# Patient Record
Sex: FEMALE | Race: WHITE
[De-identification: names, ages, dates, MRNs, and addresses within clinical notes are randomized per-mention and may not be internally consistent; named-entity substitution may affect disease eponyms.]

---

## 2018-09-18 ENCOUNTER — TRANSCRIPTION ENCOUNTER (OUTPATIENT)
Age: 58
End: 2018-09-18

## 2022-10-25 PROBLEM — Z00.00 ENCOUNTER FOR PREVENTIVE HEALTH EXAMINATION: Status: ACTIVE | Noted: 2022-10-25

## 2022-10-26 ENCOUNTER — APPOINTMENT (OUTPATIENT)
Dept: PULMONOLOGY | Facility: CLINIC | Age: 62
End: 2022-10-26

## 2022-10-26 VITALS
SYSTOLIC BLOOD PRESSURE: 138 MMHG | OXYGEN SATURATION: 100 % | WEIGHT: 138 LBS | DIASTOLIC BLOOD PRESSURE: 80 MMHG | BODY MASS INDEX: 22.18 KG/M2 | TEMPERATURE: 97.2 F | HEART RATE: 69 BPM | HEIGHT: 66 IN

## 2022-10-26 PROCEDURE — 99204 OFFICE O/P NEW MOD 45 MIN: CPT | Mod: 25

## 2022-10-26 PROCEDURE — 99407 BEHAV CHNG SMOKING > 10 MIN: CPT

## 2022-10-26 NOTE — ASSESSMENT
[FreeTextEntry1] : 63 yo woman referred by Dr Esacmilla for evaluation of COPD\par Previously seen by pulmonologist\par Smoker for >40 years and still smokes almost 1 ppd\par Has not been interested in stopping\par Treated for COPD with Incruse only\par No history of hosptialiZations for COPD \par She had been taking care of her sister who recently passed away--also a heavy smoker\par Hx HT on amlodipine, Hx HLD on Lipitor\par No med allergies noted\par Not , two children--her son brought her here\par Says she walks several miles a day without difficulty\par \par Lung Cancer screening 2/22 performed and reviewed\par Lung RADS 2, mod to severe emphysema changes noted\par \par Imp\par 63 yo woman with HT and HLD\par She is a longterm and persistent smoker\par Probable COPD based upon history and CT findings\par \par  We had an extensive discussion regarding the multiple potential complications of chronic smoking including pulmonary disease, shortness of breath, emphysema, requirement for oxygen,  pulmonary neoplasm, cardiovascular disease, and cerebrovascular disease. We discussed potential strategies for decreasing smoking and smoking cessation for at least 12 minutes during the patient's visit.\par  \par Recommend Renew Incruse\par Repeat lung cancer screening in Feb 23\par At next visit, do PFTs\par Encourage smoking cessation

## 2022-10-26 NOTE — HISTORY OF PRESENT ILLNESS
[TextBox_4] : 63 yo woman referred by Dr Escamilla for evaluation of COPD\par Previously seen by pulmonologist\par Smoker for >40 years and still smokes almost 1 ppd\par Has not been interested in stopping\par Treated for COPD with Incruse only\par No history of hosptialiZations for COPD \par She had been taking care of her sister who recently passed away--also a heavy smoker\par Hx HT on amlodipine, Hx HLD on Lipitor\par No med allergies noted\par Not , two children--her son brought her here\par Says she walks several miles a day without difficulty

## 2022-10-26 NOTE — CONSULT LETTER
[Dear  ___] : Dear  [unfilled], [FreeTextEntry1] : I had the pleasure of evaluating your patient, AMY OLIVERA , in the office today.  Please review my consultation and evaluation report that follows below.  Please do not hesitate to call me if further information is necessary or if you wish to discuss ongoing care or diagnostic work-up.   \par I very much appreciate your referral and it is a privilege to be able to provide care for your patient.\par \par Sincerely,\par  \par Jak William MD, MHCM, FACP, DAWSON-C\par Pulmonary Medicine\par  of Medicine\par [facility] [employer] [facility] at [facility]\par [email or web address] \par [facility] [place]\par [address]\[place]  [address]\par    Fax \par \par [facility] [place]\par [address]\par [place], [place] \par \par

## 2023-01-06 ENCOUNTER — NON-APPOINTMENT (OUTPATIENT)
Age: 63
End: 2023-01-06

## 2023-02-15 ENCOUNTER — APPOINTMENT (OUTPATIENT)
Dept: PULMONOLOGY | Facility: CLINIC | Age: 63
End: 2023-02-15
Payer: MEDICAID

## 2023-02-15 VITALS
HEART RATE: 99 BPM | OXYGEN SATURATION: 97 % | WEIGHT: 133 LBS | BODY MASS INDEX: 21.38 KG/M2 | TEMPERATURE: 97.3 F | HEIGHT: 66 IN | DIASTOLIC BLOOD PRESSURE: 79 MMHG | SYSTOLIC BLOOD PRESSURE: 143 MMHG

## 2023-02-15 PROCEDURE — 99214 OFFICE O/P EST MOD 30 MIN: CPT | Mod: 25

## 2023-02-15 PROCEDURE — 99406 BEHAV CHNG SMOKING 3-10 MIN: CPT

## 2023-02-15 NOTE — CONSULT LETTER
[Dear  ___] : Dear  [unfilled], [FreeTextEntry1] : I had the pleasure of evaluating your patient, AMY OLIVERA , in the office today.  Please review my consultation and evaluation report that follows below.  Please do not hesitate to call me if further information is necessary or if you wish to discuss ongoing care or diagnostic work-up.   \par I very much appreciate your referral and it is a privilege to be able to provide care for your patient.\par \par Sincerely,\par  \par Jak William MD, MHCM, FACP, DAWSON-C\par Pulmonary Medicine\par  of Medicine\par [employer] [facility] at [facility]\par [email or web address]\par \par [facility] in [place]\par [address]\[place]  [address]\par    Fax \par \par [facility] [place]\par [address]\par [place], [place] \par \par

## 2023-02-15 NOTE — ASSESSMENT
[FreeTextEntry1] : 61 yo woman referred by Dr Escamilla for evaluation of COPD\par Previously seen by pulmonologist\par Smoker for >40 years and still smokes almost 1 ppd\par Has not been interested in stopping\par Treated for COPD with Incruse only\par No history of hosptialiZations for COPD \par She had been taking care of her sister who recently passed away--also a heavy smoker\par Hx HT on amlodipine, Hx HLD on Lipitor\par No med allergies noted\par Not , two children--her son brought her here\par Says she walks several miles a day without difficulty. \par \par Interim:\par Still smoking a bit more than 1/2 ppd unfortunatley\par Has not attempted to stop\par On Incruse daily--wants refill, doesn’t have rescue inhaler\par Had CT Lung Cancer Screening on 12/28/22\par Small nodules which require f/u in one year\par Lung RADS 2\par Patchy areas of emphysema\par \par Imp\par 61 yo woman with longterm smoking and probable COPD\par Discussed smoking again extensively\par Renew Incruse, add albuterol\par Recommend referral for PFTs \par RTC 3 months\par Lung Screening in Dec 23

## 2023-02-15 NOTE — HISTORY OF PRESENT ILLNESS
[TextBox_4] : 63 yo woman referred by Dr Escamilla for evaluation of COPD\par Previously seen by pulmonologist\par Smoker for >40 years and still smokes almost 1 ppd\par Has not been interested in stopping\par Treated for COPD with Incruse only\par No history of hosptalizations for COPD \par She had been taking care of her sister who recently passed away--also a heavy smoker\par Hx HT on amlodipine, Hx HLD on Lipitor\par No med allergies noted\par Not , two children--her son brought her here\par Says she walks several miles a day without difficulty. \par \par Interim:\par Still smoking a bit more than 1/2 ppd unfortunatley\par Has not attempted to stop\par On Incruse daily--wants refill, doesn’t have rescue inhaler\par Had CT Lung Cancer Screening on 12/28/22\par Small nodules which require f/u in one year\par Lung RADS 2\par Patchy areas of emphysema

## 2023-05-15 ENCOUNTER — APPOINTMENT (OUTPATIENT)
Dept: PULMONOLOGY | Facility: CLINIC | Age: 63
End: 2023-05-15
Payer: MEDICAID

## 2023-05-15 VITALS — HEIGHT: 66 IN | BODY MASS INDEX: 22.34 KG/M2 | WEIGHT: 139 LBS

## 2023-05-15 VITALS
DIASTOLIC BLOOD PRESSURE: 58 MMHG | RESPIRATION RATE: 16 BRPM | SYSTOLIC BLOOD PRESSURE: 130 MMHG | HEART RATE: 60 BPM | OXYGEN SATURATION: 99 %

## 2023-05-15 DIAGNOSIS — J44.9 CHRONIC OBSTRUCTIVE PULMONARY DISEASE, UNSPECIFIED: ICD-10-CM

## 2023-05-15 DIAGNOSIS — F17.200 NICOTINE DEPENDENCE, UNSPECIFIED, UNCOMPLICATED: ICD-10-CM

## 2023-05-15 PROCEDURE — 99214 OFFICE O/P EST MOD 30 MIN: CPT | Mod: 25

## 2023-05-15 PROCEDURE — 99407 BEHAV CHNG SMOKING > 10 MIN: CPT

## 2023-05-15 PROCEDURE — 94010 BREATHING CAPACITY TEST: CPT

## 2023-05-15 RX ORDER — AMLODIPINE BESYLATE 5 MG/1
TABLET ORAL
Refills: 0 | Status: ACTIVE | COMMUNITY

## 2023-05-15 RX ORDER — ALBUTEROL SULFATE 90 UG/1
108 (90 BASE) INHALANT RESPIRATORY (INHALATION)
Qty: 1 | Refills: 1 | Status: ACTIVE | COMMUNITY
Start: 2023-05-15 | End: 1900-01-01

## 2023-05-15 RX ORDER — ATORVASTATIN CALCIUM 80 MG/1
TABLET, FILM COATED ORAL
Refills: 0 | Status: ACTIVE | COMMUNITY

## 2023-05-17 RX ORDER — UMECLIDINIUM 62.5 UG/1
62.5 AEROSOL, POWDER ORAL
Qty: 3 | Refills: 3 | Status: COMPLETED | COMMUNITY
Start: 2022-10-26 | End: 2023-05-17

## 2023-05-17 RX ORDER — ALBUTEROL SULFATE 90 UG/1
108 (90 BASE) AEROSOL, METERED RESPIRATORY (INHALATION)
Qty: 3 | Refills: 3 | Status: ACTIVE | COMMUNITY
Start: 2023-02-15 | End: 1900-01-01

## 2023-05-17 RX ORDER — UMECLIDINIUM 62.5 UG/1
62.5 AEROSOL, POWDER ORAL
Qty: 90 | Refills: 3 | Status: COMPLETED | COMMUNITY
Start: 2023-02-15 | End: 2023-05-17

## 2023-05-17 NOTE — HISTORY OF PRESENT ILLNESS
[TextBox_4] : 63 yo woman referred by Dr Escamilla for evaluation of COPD\par Previously seen by pulmonologist\par Smoker for >40 years and still smokes almost 1 ppd\par Has not been interested in stopping\par Treated for COPD with Incruse only\par No history of hosptalizations for COPD \par She had been taking care of her sister who recently passed away--also a heavy smoker\par Hx HT on amlodipine, Hx HLD on Lipitor\par No med allergies noted\par Not , two children--her son brought her here\par Says she walks several miles a day without difficulty. \par \par Interim:\par Last seen Feb 15\par Still smoking almost 1 ppd\par CT Lung Cancer Screening 12/22 showed Lung RADS 2\par \par Using Incruse, minimal use of albuterol

## 2023-05-17 NOTE — ASSESSMENT
[FreeTextEntry1] : 63 yo woman referred by Dr Escamilla for evaluation of COPD\par Previously seen by pulmonologist\par Smoker for >40 years and still smokes almost 1 ppd\par Has not been interested in stopping\par Treated for COPD with Incruse only\par No history of hosptalizations for COPD \par She had been taking care of her sister who recently passed away--also a heavy smoker\par Hx HT on amlodipine, Hx HLD on Lipitor\par No med allergies noted\par Not , two children--her son brought her here\par Says she walks several miles a day without difficulty. \par \par Interim:\par Last seen Feb 15\par Still smoking almost 1 ppd\par CT Lung Cancer Screening 12/22 showed Lung RADS 2\par \par Using Incruse, minimal use of albuterol\par \par Keyser\par \par FEV1 2.06  83%\par FEV1% 68%Midexpir flow 49% pred\par Consistent with COPD, Gold 1\par \par Imp \par 63 yo woman with longterm smoking history\par Mild COPD based upon symptoms and spirometry\par Recommend continuing Incruse and albuterol\par Extensive discussion again regarding smoking risks and need to cut down and discontinue\par Discussed for 11 minutes\par RTC in December after next Lung Cancer screening

## 2023-05-17 NOTE — CONSULT LETTER
[Dear  ___] : Dear  [unfilled], [FreeTextEntry1] : I had the pleasure of evaluating your patient, AMY OLIVERA , in the office today.  Please review my consultation and evaluation report that follows below.  Please do not hesitate to call me if further information is necessary or if you wish to discuss ongoing care or diagnostic work-up.   \par I very much appreciate your referral and it is a privilege to be able to provide care for your patient.\par \par Sincerely,\par  \par Jak William MD, MHCM, FACP, DAWSON-C\par Pulmonary Medicine\par  of Medicine\par [employer] [facility] at [facility]\par [email or web address]\par \par [facility] [place]\par [address]\[place]  [address]\par    Fax \par \par [facility] [place]\par [address]\par [place], [place] \par \par

## 2023-05-17 NOTE — PROCEDURE
[FreeTextEntry1] : Reinaldo\par \par FEV1 2.06  83%\par FEV1% 68%Midexpir flow 49% pred\par Consistent with COPD, Gold 1

## 2023-06-01 RX ORDER — ALBUTEROL SULFATE 90 UG/1
108 (90 BASE) INHALANT RESPIRATORY (INHALATION)
Qty: 1 | Refills: 3 | Status: COMPLETED | COMMUNITY
Start: 2023-02-15 | End: 2023-06-01

## 2023-06-02 RX ORDER — TIOTROPIUM BROMIDE 18 UG/1
18 CAPSULE ORAL; RESPIRATORY (INHALATION) DAILY
Qty: 1 | Refills: 3 | Status: ACTIVE | COMMUNITY
Start: 2023-06-02 | End: 1900-01-01

## 2023-06-02 RX ORDER — UMECLIDINIUM 62.5 UG/1
62.5 AEROSOL, POWDER ORAL
Qty: 90 | Refills: 3 | Status: COMPLETED | COMMUNITY
Start: 2023-05-15 | End: 2023-06-02

## 2023-12-19 ENCOUNTER — APPOINTMENT (OUTPATIENT)
Dept: PULMONOLOGY | Facility: CLINIC | Age: 63
End: 2023-12-19

## 2024-01-09 ENCOUNTER — NON-APPOINTMENT (OUTPATIENT)
Age: 64
End: 2024-01-09

## 2024-01-16 ENCOUNTER — APPOINTMENT (OUTPATIENT)
Dept: UROLOGY | Facility: CLINIC | Age: 64
End: 2024-01-16
Payer: MEDICARE

## 2024-01-16 VITALS
HEIGHT: 67.5 IN | DIASTOLIC BLOOD PRESSURE: 70 MMHG | SYSTOLIC BLOOD PRESSURE: 123 MMHG | HEART RATE: 71 BPM | RESPIRATION RATE: 14 BRPM | BODY MASS INDEX: 20.94 KG/M2 | WEIGHT: 135 LBS

## 2024-01-16 DIAGNOSIS — Z63.4 DISAPPEARANCE AND DEATH OF FAMILY MEMBER: ICD-10-CM

## 2024-01-16 DIAGNOSIS — Z86.79 PERSONAL HISTORY OF OTHER DISEASES OF THE CIRCULATORY SYSTEM: ICD-10-CM

## 2024-01-16 DIAGNOSIS — Z86.39 PERSONAL HISTORY OF OTHER ENDOCRINE, NUTRITIONAL AND METABOLIC DISEASE: ICD-10-CM

## 2024-01-16 DIAGNOSIS — F17.210 NICOTINE DEPENDENCE, CIGARETTES, UNCOMPLICATED: ICD-10-CM

## 2024-01-16 PROCEDURE — 99203 OFFICE O/P NEW LOW 30 MIN: CPT

## 2024-01-16 RX ORDER — AMLODIPINE BESYLATE 5 MG/1
TABLET ORAL
Refills: 0 | Status: ACTIVE | COMMUNITY

## 2024-01-16 SDOH — SOCIAL STABILITY - SOCIAL INSECURITY: DISSAPEARANCE AND DEATH OF FAMILY MEMBER: Z63.4

## 2024-01-16 NOTE — PHYSICAL EXAM
[Normal Appearance] : normal appearance [General Appearance - In No Acute Distress] : no acute distress [] : no respiratory distress [Normal Station and Gait] : the gait and station were normal for the patient's age [No Focal Deficits] : no focal deficits [Oriented To Time, Place, And Person] : oriented to person, place, and time [de-identified] : normal peripheral circulation

## 2024-01-16 NOTE — HISTORY OF PRESENT ILLNESS
[FreeTextEntry1] : Primary care physician: Dr Smalls, [facility].   63 -year-old female presents for Microhematuria.  Recently had urine test and was told has microscopic hematuria.  Denies gross hematuria, no history of kidney stones or recurrent urinary tract infections.  Smoker. 0.5 PPD for 40 years.  No occupational exposure.  Gynecologist had told patient has urethral prolapse in September 2023. Daytime frequency is 4 to 5 x or so and nocturia of 0-1 x.  Endorses urgency, urge incontinence and incontinence with cough and sneeze. Has on and off dysuria. Denies lower abdominal or flank pain, fever, chills or rigors.  No history of Breast or Uterine cancer.  No history of blood clots, Stroke or Heart attack.

## 2024-01-16 NOTE — ASSESSMENT
[FreeTextEntry1] : Reviewed records including [facility].  Discussed labs and imaging.   10/16/2023: Creatinine: 0.64  8/14/2023: Urinalysis: RBCs 3-10, WBCs 6-10, LE 2+, bacteria none  Microhematuria: Discussed the differential diagnosis of hematuria including benign and malignant pathology- including but not limited to nephrolithiasis, bladder stone, urinary tract infection, glomerular disease, renal cancer, bladder cancer.  Also discussed the chance that workup will not reveal a source for the bleeding. The patient understands that the hematuria could be from an upper tract (kidney or ureter) or lower tract (bladder, urethra) and that workup includes imaging and direct visualization of all of these.  Will proceed with work up with Urinalysis with microscopy, Urine culture, Urine cytology and CT Urogram.  Patient hesitant to undergo cystoscopy.  Return to office after CT scan.   Patient relocating to [place] in April 24

## 2024-01-16 NOTE — LETTER BODY
[Dear  ___] : Dear  [unfilled], [Consult Letter:] : I had the pleasure of evaluating your patient, [unfilled]. [( Thank you for referring [unfilled] for consultation for _____ )] : Thank you for referring [unfilled] for consultation for [unfilled] [Please see my note below.] : Please see my note below. [Sincerely,] : Sincerely, [FreeTextEntry3] : Jono Aguilar MD  of Urology [facility]  [facility] Offices: [address] [facility], [address] [place] [address], [facility] [address] [facility], [place] [address]  TEL: [phone] FAX: [phone]

## 2024-01-17 LAB
APPEARANCE: CLEAR
BACTERIA: ABNORMAL /HPF
BILIRUBIN URINE: NEGATIVE
BLOOD URINE: ABNORMAL
CAST: 0 /LPF
COLOR: YELLOW
EPITHELIAL CELLS: 5 /HPF
GLUCOSE QUALITATIVE U: NEGATIVE MG/DL
KETONES URINE: NEGATIVE MG/DL
LEUKOCYTE ESTERASE URINE: ABNORMAL
MICROSCOPIC-UA: NORMAL
NITRITE URINE: NEGATIVE
PH URINE: 6.5
PROTEIN URINE: NEGATIVE MG/DL
RED BLOOD CELLS URINE: 44 /HPF
SPECIFIC GRAVITY URINE: 1.01
URINE CYTOLOGY: NORMAL
UROBILINOGEN URINE: 0.2 MG/DL
WHITE BLOOD CELLS URINE: 2 /HPF

## 2024-01-18 LAB — BACTERIA UR CULT: NORMAL

## 2024-03-12 ENCOUNTER — APPOINTMENT (OUTPATIENT)
Dept: UROLOGY | Facility: CLINIC | Age: 64
End: 2024-03-12
Payer: MEDICARE

## 2024-03-12 VITALS
HEIGHT: 68 IN | BODY MASS INDEX: 20.16 KG/M2 | SYSTOLIC BLOOD PRESSURE: 135 MMHG | HEART RATE: 87 BPM | DIASTOLIC BLOOD PRESSURE: 74 MMHG | OXYGEN SATURATION: 97 % | WEIGHT: 133 LBS

## 2024-03-12 DIAGNOSIS — N20.0 CALCULUS OF KIDNEY: ICD-10-CM

## 2024-03-12 DIAGNOSIS — R31.29 OTHER MICROSCOPIC HEMATURIA: ICD-10-CM

## 2024-03-12 PROCEDURE — 99214 OFFICE O/P EST MOD 30 MIN: CPT

## 2024-03-17 PROBLEM — N20.0 KIDNEY STONE ON LEFT SIDE: Status: ACTIVE | Noted: 2024-03-17

## 2024-03-17 PROBLEM — R31.29 MICROHEMATURIA: Status: ACTIVE | Noted: 2024-01-16

## 2024-03-17 NOTE — ASSESSMENT
[FreeTextEntry1] : Reviewed records. Discussed labs and imaging.   CT Urogram (1/27/2024): 2 to 3 mm left upper pole kidney stone.  Mild posterior bladder base cystocele.   Non genitourinary findings were discussed and asked Patient to discuss them with PCP and respective physicians.   Kidney stone: Discussed the management options for non-obstructing kidney stones- watch and wait Vs Ureteroscopy Vs Shock wave lithotripsy: if radio-opaque or ultrasound amenable.  Risks and benefits of each modality were discussed.  Patient will observe.  Recommended Kidney stone prevention diet: Good oral hydration so that urine is clear to light yellow, usually 1.5 to 2 Liters of fluids, mainly water Increasing Citrate in diet by consuming citrus fruits and juices- darshana, limes, oranges, grapefruits and berries  Less red meat Less salt Limit foods with oxalate like- dark green vegetables, rhubarb, chocolate, wheat bran, nuts, cranberries, and bean   Microhematuria: Discussed work up of hematuria so far. Discussed Cystoscopy, the procedure, risks and benefits. Patient hesitant to undergo cystoscopy.  Patient relocating to [place] in April 2024.

## 2024-03-17 NOTE — HISTORY OF PRESENT ILLNESS
[FreeTextEntry1] : Primary care physician: Dr Smalls, [facility].   63 -year-old female presents for follow up.  Had CT Urogram.   Seen on 1/16/24 for Microhematuria.  Denied gross hematuria, no history of kidney stones or recurrent urinary tract infections.  Smoker. 0.5 PPD for 40 years.  No occupational exposure.  Gynecologist had told patient has urethral prolapse in September 2023. Daytime frequency is 4 to 5 x or so and nocturia of 0-1 x.  Endorsed urgency, urge incontinence and incontinence with cough and sneeze. Had on and off dysuria. Denied lower abdominal or flank pain, fever, chills or rigors.  No history of Breast or Uterine cancer.  No history of blood clots, Stroke or Heart attack.

## 2024-03-17 NOTE — PHYSICAL EXAM
[Normal Appearance] : normal appearance [General Appearance - In No Acute Distress] : no acute distress [Normal Station and Gait] : the gait and station were normal for the patient's age [] : no respiratory distress [Oriented To Time, Place, And Person] : oriented to person, place, and time [de-identified] : normal peripheral circulation

## 2024-03-17 NOTE — LETTER BODY
[Dear  ___] : Dear  [unfilled], [Consult Letter:] : I had the pleasure of evaluating your patient, [unfilled]. [( Thank you for referring [unfilled] for consultation for _____ )] : Thank you for referring [unfilled] for consultation for [unfilled] [Please see my note below.] : Please see my note below. [Sincerely,] : Sincerely, [FreeTextEntry3] : Jono Aguilar MD  of Urology [facility]  [facility] Offices: [address]", [address] [place] [address], [facility], [place] [address]  TEL: [phone] FAX: [phone]